# Patient Record
Sex: FEMALE | Race: WHITE | NOT HISPANIC OR LATINO | Employment: FULL TIME | URBAN - METROPOLITAN AREA
[De-identification: names, ages, dates, MRNs, and addresses within clinical notes are randomized per-mention and may not be internally consistent; named-entity substitution may affect disease eponyms.]

---

## 2018-12-06 ENCOUNTER — HOSPITAL ENCOUNTER (INPATIENT)
Facility: HOSPITAL | Age: 48
LOS: 2 days | Discharge: HOME | DRG: 155 | End: 2018-12-09
Attending: EMERGENCY MEDICINE | Admitting: INTERNAL MEDICINE
Payer: COMMERCIAL

## 2018-12-06 DIAGNOSIS — K11.20 SIALADENITIS: Primary | ICD-10-CM

## 2018-12-06 PROCEDURE — 99285 EMERGENCY DEPT VISIT HI MDM: CPT

## 2018-12-06 PROCEDURE — 81025 URINE PREGNANCY TEST: CPT | Performed by: EMERGENCY MEDICINE

## 2018-12-06 RX ORDER — KETOROLAC TROMETHAMINE 30 MG/ML
15 INJECTION, SOLUTION INTRAMUSCULAR; INTRAVENOUS ONCE
Status: COMPLETED | OUTPATIENT
Start: 2018-12-06 | End: 2018-12-07

## 2018-12-06 RX ORDER — DEXAMETHASONE SODIUM PHOSPHATE 4 MG/ML
10 INJECTION, SOLUTION INTRA-ARTICULAR; INTRALESIONAL; INTRAMUSCULAR; INTRAVENOUS; SOFT TISSUE ONCE
Status: COMPLETED | OUTPATIENT
Start: 2018-12-06 | End: 2018-12-07

## 2018-12-07 ENCOUNTER — APPOINTMENT (EMERGENCY)
Dept: CT IMAGING | Facility: HOSPITAL | Age: 48
DRG: 155 | End: 2018-12-07
Payer: COMMERCIAL

## 2018-12-07 PROBLEM — K11.20 SIALADENITIS: Status: ACTIVE | Noted: 2018-12-07

## 2018-12-07 PROBLEM — K21.9 GERD (GASTROESOPHAGEAL REFLUX DISEASE): Status: ACTIVE | Noted: 2018-12-07

## 2018-12-07 PROBLEM — J30.2 SEASONAL ALLERGIES: Status: ACTIVE | Noted: 2018-12-07

## 2018-12-07 LAB
ANION GAP SERPL CALCULATED.3IONS-SCNC: 8 MMOL/L (ref 4–13)
ANION GAP SERPL CALCULATED.3IONS-SCNC: 9 MMOL/L (ref 4–13)
BASOPHILS # BLD AUTO: 0.03 THOUSANDS/ΜL (ref 0–0.1)
BASOPHILS NFR BLD AUTO: 0 % (ref 0–1)
BUN SERPL-MCNC: 10 MG/DL (ref 5–25)
BUN SERPL-MCNC: 13 MG/DL (ref 5–25)
CALCIUM SERPL-MCNC: 10.5 MG/DL (ref 8.3–10.1)
CALCIUM SERPL-MCNC: 11.1 MG/DL (ref 8.3–10.1)
CHLORIDE SERPL-SCNC: 101 MMOL/L (ref 100–108)
CHLORIDE SERPL-SCNC: 105 MMOL/L (ref 100–108)
CO2 SERPL-SCNC: 25 MMOL/L (ref 21–32)
CO2 SERPL-SCNC: 29 MMOL/L (ref 21–32)
CREAT SERPL-MCNC: 0.74 MG/DL (ref 0.6–1.3)
CREAT SERPL-MCNC: 0.81 MG/DL (ref 0.6–1.3)
EOSINOPHIL # BLD AUTO: 0.09 THOUSAND/ΜL (ref 0–0.61)
EOSINOPHIL NFR BLD AUTO: 1 % (ref 0–6)
ERYTHROCYTE [DISTWIDTH] IN BLOOD BY AUTOMATED COUNT: 11.5 % (ref 11.6–15.1)
ERYTHROCYTE [DISTWIDTH] IN BLOOD BY AUTOMATED COUNT: 11.6 % (ref 11.6–15.1)
EXT PREG TEST URINE: NEGATIVE
GFR SERPL CREATININE-BSD FRML MDRD: 87 ML/MIN/1.73SQ M
GFR SERPL CREATININE-BSD FRML MDRD: 97 ML/MIN/1.73SQ M
GLUCOSE SERPL-MCNC: 128 MG/DL (ref 65–140)
GLUCOSE SERPL-MCNC: 165 MG/DL (ref 65–140)
HCT VFR BLD AUTO: 42.8 % (ref 34.8–46.1)
HCT VFR BLD AUTO: 48.1 % (ref 34.8–46.1)
HGB BLD-MCNC: 14.1 G/DL (ref 11.5–15.4)
HGB BLD-MCNC: 15.8 G/DL (ref 11.5–15.4)
IMM GRANULOCYTES # BLD AUTO: 0.03 THOUSAND/UL (ref 0–0.2)
IMM GRANULOCYTES NFR BLD AUTO: 0 % (ref 0–2)
LYMPHOCYTES # BLD AUTO: 0.75 THOUSANDS/ΜL (ref 0.6–4.47)
LYMPHOCYTES NFR BLD AUTO: 7 % (ref 14–44)
MCH RBC QN AUTO: 30.9 PG (ref 26.8–34.3)
MCH RBC QN AUTO: 31.1 PG (ref 26.8–34.3)
MCHC RBC AUTO-ENTMCNC: 32.8 G/DL (ref 31.4–37.4)
MCHC RBC AUTO-ENTMCNC: 32.9 G/DL (ref 31.4–37.4)
MCV RBC AUTO: 94 FL (ref 82–98)
MCV RBC AUTO: 94 FL (ref 82–98)
MONOCYTES # BLD AUTO: 0.62 THOUSAND/ΜL (ref 0.17–1.22)
MONOCYTES NFR BLD AUTO: 6 % (ref 4–12)
NEUTROPHILS # BLD AUTO: 9.29 THOUSANDS/ΜL (ref 1.85–7.62)
NEUTS SEG NFR BLD AUTO: 86 % (ref 43–75)
NRBC BLD AUTO-RTO: 0 /100 WBCS
PLATELET # BLD AUTO: 224 THOUSANDS/UL (ref 149–390)
PLATELET # BLD AUTO: 256 THOUSANDS/UL (ref 149–390)
PMV BLD AUTO: 10.1 FL (ref 8.9–12.7)
PMV BLD AUTO: 10.2 FL (ref 8.9–12.7)
POTASSIUM SERPL-SCNC: 4 MMOL/L (ref 3.5–5.3)
POTASSIUM SERPL-SCNC: 4.2 MMOL/L (ref 3.5–5.3)
RBC # BLD AUTO: 4.54 MILLION/UL (ref 3.81–5.12)
RBC # BLD AUTO: 5.11 MILLION/UL (ref 3.81–5.12)
SODIUM SERPL-SCNC: 138 MMOL/L (ref 136–145)
SODIUM SERPL-SCNC: 139 MMOL/L (ref 136–145)
WBC # BLD AUTO: 10.37 THOUSAND/UL (ref 4.31–10.16)
WBC # BLD AUTO: 10.81 THOUSAND/UL (ref 4.31–10.16)

## 2018-12-07 PROCEDURE — 85025 COMPLETE CBC W/AUTO DIFF WBC: CPT | Performed by: EMERGENCY MEDICINE

## 2018-12-07 PROCEDURE — 96361 HYDRATE IV INFUSION ADD-ON: CPT

## 2018-12-07 PROCEDURE — 85027 COMPLETE CBC AUTOMATED: CPT | Performed by: PHYSICIAN ASSISTANT

## 2018-12-07 PROCEDURE — 96375 TX/PRO/DX INJ NEW DRUG ADDON: CPT

## 2018-12-07 PROCEDURE — 96365 THER/PROPH/DIAG IV INF INIT: CPT

## 2018-12-07 PROCEDURE — 87040 BLOOD CULTURE FOR BACTERIA: CPT | Performed by: EMERGENCY MEDICINE

## 2018-12-07 PROCEDURE — 99220 PR INITIAL OBSERVATION CARE/DAY 70 MINUTES: CPT | Performed by: INTERNAL MEDICINE

## 2018-12-07 PROCEDURE — 80048 BASIC METABOLIC PNL TOTAL CA: CPT | Performed by: PHYSICIAN ASSISTANT

## 2018-12-07 PROCEDURE — 80048 BASIC METABOLIC PNL TOTAL CA: CPT | Performed by: EMERGENCY MEDICINE

## 2018-12-07 PROCEDURE — 36415 COLL VENOUS BLD VENIPUNCTURE: CPT | Performed by: EMERGENCY MEDICINE

## 2018-12-07 PROCEDURE — 70491 CT SOFT TISSUE NECK W/DYE: CPT

## 2018-12-07 RX ORDER — FENTANYL CITRATE 50 UG/ML
50 INJECTION, SOLUTION INTRAMUSCULAR; INTRAVENOUS ONCE
Status: COMPLETED | OUTPATIENT
Start: 2018-12-07 | End: 2018-12-07

## 2018-12-07 RX ORDER — KETOROLAC TROMETHAMINE 30 MG/ML
30 INJECTION, SOLUTION INTRAMUSCULAR; INTRAVENOUS EVERY 6 HOURS PRN
Status: DISPENSED | OUTPATIENT
Start: 2018-12-07 | End: 2018-12-08

## 2018-12-07 RX ORDER — CEPHALEXIN 500 MG/1
500 CAPSULE ORAL EVERY 8 HOURS SCHEDULED
COMMUNITY
End: 2018-12-09 | Stop reason: HOSPADM

## 2018-12-07 RX ORDER — FLUTICASONE PROPIONATE 50 MCG
1 SPRAY, SUSPENSION (ML) NASAL DAILY
Status: DISCONTINUED | OUTPATIENT
Start: 2018-12-07 | End: 2018-12-09 | Stop reason: HOSPADM

## 2018-12-07 RX ORDER — FLUTICASONE PROPIONATE 50 MCG
1 SPRAY, SUSPENSION (ML) NASAL DAILY
COMMUNITY

## 2018-12-07 RX ORDER — ACETAMINOPHEN 325 MG/1
650 TABLET ORAL EVERY 6 HOURS PRN
Status: DISCONTINUED | OUTPATIENT
Start: 2018-12-07 | End: 2018-12-09 | Stop reason: HOSPADM

## 2018-12-07 RX ORDER — MELATONIN
1000 DAILY
COMMUNITY

## 2018-12-07 RX ORDER — PANTOPRAZOLE SODIUM 20 MG/1
20 TABLET, DELAYED RELEASE ORAL
Status: DISCONTINUED | OUTPATIENT
Start: 2018-12-07 | End: 2018-12-09 | Stop reason: HOSPADM

## 2018-12-07 RX ORDER — PANTOPRAZOLE SODIUM 20 MG/1
20 TABLET, DELAYED RELEASE ORAL DAILY
COMMUNITY

## 2018-12-07 RX ORDER — LORATADINE 10 MG/1
10 TABLET ORAL DAILY
Status: DISCONTINUED | OUTPATIENT
Start: 2018-12-07 | End: 2018-12-09 | Stop reason: HOSPADM

## 2018-12-07 RX ORDER — SODIUM CHLORIDE 9 MG/ML
125 INJECTION, SOLUTION INTRAVENOUS CONTINUOUS
Status: DISCONTINUED | OUTPATIENT
Start: 2018-12-07 | End: 2018-12-09 | Stop reason: HOSPADM

## 2018-12-07 RX ORDER — CETIRIZINE HYDROCHLORIDE 5 MG/1
5 TABLET ORAL DAILY
COMMUNITY

## 2018-12-07 RX ORDER — ONDANSETRON 2 MG/ML
4 INJECTION INTRAMUSCULAR; INTRAVENOUS EVERY 6 HOURS PRN
Status: DISCONTINUED | OUTPATIENT
Start: 2018-12-07 | End: 2018-12-09 | Stop reason: HOSPADM

## 2018-12-07 RX ORDER — MELATONIN
1000 DAILY
Status: DISCONTINUED | OUTPATIENT
Start: 2018-12-07 | End: 2018-12-09 | Stop reason: HOSPADM

## 2018-12-07 RX ORDER — HYDROMORPHONE HCL/PF 1 MG/ML
1 SYRINGE (ML) INJECTION
Status: DISCONTINUED | OUTPATIENT
Start: 2018-12-07 | End: 2018-12-09 | Stop reason: HOSPADM

## 2018-12-07 RX ADMIN — SODIUM CHLORIDE 3 G: 9 INJECTION, SOLUTION INTRAVENOUS at 05:51

## 2018-12-07 RX ADMIN — FENTANYL CITRATE 50 MCG: 50 INJECTION, SOLUTION INTRAMUSCULAR; INTRAVENOUS at 02:19

## 2018-12-07 RX ADMIN — SODIUM CHLORIDE 125 ML/HR: 0.9 INJECTION, SOLUTION INTRAVENOUS at 04:19

## 2018-12-07 RX ADMIN — DEXAMETHASONE SODIUM PHOSPHATE 10 MG: 4 INJECTION, SOLUTION INTRAMUSCULAR; INTRAVENOUS at 00:03

## 2018-12-07 RX ADMIN — FLUTICASONE PROPIONATE 1 SPRAY: 50 SPRAY, METERED NASAL at 09:47

## 2018-12-07 RX ADMIN — KETOROLAC TROMETHAMINE 30 MG: 30 INJECTION, SOLUTION INTRAMUSCULAR at 20:23

## 2018-12-07 RX ADMIN — KETOROLAC TROMETHAMINE 30 MG: 30 INJECTION, SOLUTION INTRAMUSCULAR at 09:36

## 2018-12-07 RX ADMIN — SODIUM CHLORIDE 3 G: 9 INJECTION, SOLUTION INTRAVENOUS at 17:50

## 2018-12-07 RX ADMIN — SODIUM CHLORIDE 3 G: 9 INJECTION, SOLUTION INTRAVENOUS at 23:57

## 2018-12-07 RX ADMIN — SODIUM CHLORIDE 3 G: 9 INJECTION, SOLUTION INTRAVENOUS at 12:54

## 2018-12-07 RX ADMIN — SODIUM CHLORIDE 1000 ML: 0.9 INJECTION, SOLUTION INTRAVENOUS at 00:02

## 2018-12-07 RX ADMIN — SODIUM CHLORIDE 125 ML/HR: 0.9 INJECTION, SOLUTION INTRAVENOUS at 20:10

## 2018-12-07 RX ADMIN — LORATADINE 10 MG: 10 TABLET ORAL at 09:46

## 2018-12-07 RX ADMIN — HYDROMORPHONE HYDROCHLORIDE 1 MG: 1 INJECTION, SOLUTION INTRAMUSCULAR; INTRAVENOUS; SUBCUTANEOUS at 23:57

## 2018-12-07 RX ADMIN — KETOROLAC TROMETHAMINE 15 MG: 30 INJECTION, SOLUTION INTRAMUSCULAR at 00:02

## 2018-12-07 RX ADMIN — PANTOPRAZOLE SODIUM 20 MG: 20 TABLET, DELAYED RELEASE ORAL at 05:51

## 2018-12-07 RX ADMIN — HYDROMORPHONE HYDROCHLORIDE 1 MG: 1 INJECTION, SOLUTION INTRAMUSCULAR; INTRAVENOUS; SUBCUTANEOUS at 04:18

## 2018-12-07 RX ADMIN — IOHEXOL 85 ML: 350 INJECTION, SOLUTION INTRAVENOUS at 00:49

## 2018-12-07 RX ADMIN — SODIUM CHLORIDE 125 ML/HR: 0.9 INJECTION, SOLUTION INTRAVENOUS at 12:54

## 2018-12-07 RX ADMIN — SODIUM CHLORIDE 3 G: 9 INJECTION, SOLUTION INTRAVENOUS at 00:59

## 2018-12-07 NOTE — NURSING NOTE
Patient called via call bell to inform, "I think I just coughed up the stone " Dr Adams paged and informed  No change in patients plan, no new orders given  Stone saved in denture cup and given to patient to take to her follow up with ENT

## 2018-12-07 NOTE — PLAN OF CARE
Problem: PAIN - ADULT  Goal: Verbalizes/displays adequate comfort level or baseline comfort level  Interventions:  - Encourage patient to monitor pain and request assistance  - Assess pain using appropriate pain scale  - Administer analgesics based on type and severity of pain and evaluate response  - Implement non-pharmacological measures as appropriate and evaluate response  - Consider cultural and social influences on pain and pain management  - Notify physician/advanced practitioner if interventions unsuccessful or patient reports new pain   Outcome: Progressing      Problem: INFECTION - ADULT  Goal: Absence or prevention of progression during hospitalization  INTERVENTIONS:  - Assess and monitor for signs and symptoms of infection  - Monitor lab/diagnostic results  - Monitor all insertion sites, i e  indwelling lines, tubes, and drains  - Monitor endotracheal (as able) and nasal secretions for changes in amount and color  - [place] appropriate cooling/warming therapies per order  - Administer medications as ordered  - Instruct and encourage patient and family to use good hand hygiene technique  - Identify and instruct in appropriate isolation precautions for identified infection/condition   Outcome: Progressing    Goal: Absence of fever/infection during neutropenic period  INTERVENTIONS:  - Monitor WBC  - Implement neutropenic guidelines   Outcome: Progressing      Problem: SAFETY ADULT  Goal: Patient will remain free of falls  INTERVENTIONS:  - Assess patient frequently for physical needs  -  Identify cognitive and physical deficits and behaviors that affect risk of falls    -  [place] fall precautions as indicated by assessment   - Educate patient/family on patient safety including physical limitations  - Instruct patient to call for assistance with activity based on assessment  - Modify environment to reduce risk of injury  - Consider OT/PT consult to assist with strengthening/mobility   Outcome: Progressing    Goal: Maintain or return to baseline ADL function  INTERVENTIONS:  -  Assess patient's ability to carry out ADLs; assess patient's baseline for ADL function and identify physical deficits which impact ability to perform ADLs (bathing, care of mouth/teeth, toileting, grooming, dressing, etc )  - Assess/evaluate cause of self-care deficits   - Assess range of motion  - Assess patient's mobility; develop plan if impaired  - Assess patient's need for assistive devices and provide as appropriate  - Encourage maximum independence but intervene and supervise when necessary  ¯ Involve family in performance of ADLs  ¯ Assess for home care needs following discharge   ¯ Request OT consult to assist with ADL evaluation and planning for discharge  ¯ Provide patient education as appropriate   Outcome: Progressing    Goal: Maintain or return mobility status to optimal level  INTERVENTIONS:  - Assess patient's baseline mobility status (ambulation, transfers, stairs, etc )    - Identify cognitive and physical deficits and behaviors that affect mobility  - Identify mobility aids required to assist with transfers and/or ambulation (gait belt, sit-to-stand, lift, walker, cane, etc )  - [place] fall precautions as indicated by assessment  - Record patient progress and toleration of activity level on Mobility SBAR; progress patient to next Phase/Stage  - Instruct patient to call for assistance with activity based on assessment  - Request Rehabilitation consult to assist with strengthening/weightbearing, etc    Outcome: Progressing      Problem: DISCHARGE PLANNING  Goal: Discharge to home or other facility with appropriate resources  INTERVENTIONS:  - Identify barriers to discharge w/patient and caregiver  - Arrange for needed discharge resources and transportation as appropriate  - Identify discharge learning needs (meds, wound care, etc )  - Arrange for interpretive services to assist at discharge as needed  - Refer to Case Management Department for coordinating discharge planning if the patient needs post-hospital services based on physician/advanced practitioner order or complex needs related to functional status, cognitive ability, or social support system  Outcome: Progressing      Problem: Knowledge Deficit  Goal: Patient/family/caregiver demonstrates understanding of disease process, treatment plan, medications, and discharge instructions  Complete learning assessment and assess knowledge base    Interventions:  - Provide teaching at level of understanding  - Provide teaching via preferred learning methods  Outcome: Progressing

## 2018-12-07 NOTE — H&P
History and Physical - [facility] Internal Medicine    Patient Information: Nadine Romeo 57 y o  female MRN: [id number]  Unit/Bed#: E5 -01 Encounter: [id number]  Admitting Physician: Kadie Louie PA-C  PCP: No primary care provider on file  Date of Admission:  12/07/18    Assessment/Plan:    Hospital Problem List:     Principal Problem:    Sialadenitis  Active Problems:    GERD (gastroesophageal reflux disease)    Seasonal allergies      Plan for the Primary Problem(s):  · sialadenitis  · Admit to med/surg  Continue unasyn  Consult ENT    Plan for Additional Problems:   · GERD- continue protonix  · Seasonal allergies- continue claritin    VTE Prophylaxis: Pharmacologic VTE Prophylaxis contraindicated due to low risk  / sequential compression device   Code Status: full code  POLST: There is no POLST form on file for this patient (pre-hospital)    Anticipated Length of Stay:  Patient will be admitted on an Observation basis with an anticipated length of stay of  Less than 2 midnights  Justification for Hospital Stay: patient requires IV antibiotics and ENT consult    Total Time for Visit, including Counseling / Coordination of Care: 45 minutes  Greater than 50% of this total time spent on direct patient counseling and coordination of care  Chief Complaint:   Sore throat    History of Present Illness:    Nadine Romeo is a 57 y o  female who presents with sore throat  Patient developed swelling of a lymph node on the right side of her neck several weeks ago but it was not painful  She states 2 days ago she noticed it was larger in size, then it became very painful  Pain extends to jaw and ear  She denies fever  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  Review of Systems:    Review of Systems   Constitutional: Negative  HENT: Positive for sore throat  Eyes: Negative  Respiratory: Negative  Cardiovascular: Negative  Gastrointestinal: Negative  Endocrine: Negative  Genitourinary: Negative  Musculoskeletal: Negative  Skin: Negative  Allergic/Immunologic: Negative  Neurological: Negative  Hematological: Negative  Psychiatric/Behavioral: Negative  Past Medical and Surgical History:     History reviewed  No pertinent past medical history  Past Surgical History:   Procedure Laterality Date    CHOLECYSTECTOMY      HYSTERECTOMY      KNEE SURGERY         Meds/Allergies:    Prior to Admission medications    Medication Sig Start Date End Date Taking? Authorizing Provider   cephalexin (KEFLEX) 500 mg capsule Take 500 mg by mouth every 8 (eight) hours   Yes Historical Provider, MD   cetirizine (ZyrTEC) 5 MG tablet Take 5 mg by mouth daily   Yes Historical Provider, MD   cholecalciferol (VITAMIN D3) 1,000 units tablet Take 1,000 Units by mouth daily   Yes Historical Provider, MD   fluticasone (FLONASE) 50 mcg/act nasal spray 1 spray into each nostril daily   Yes Historical Provider, MD   pantoprazole (PROTONIX) 20 mg tablet Take 20 mg by mouth daily   Yes Historical Provider, MD     I have reviewed home medications with patient personally  Allergies:    Allergies   Allergen Reactions    Morphine        Social History:     Marital Status: /Civil Union   Occupation: /nursing assistant  Patient Pre-hospital Living Situation: lives with family  Patient Pre-hospital Level of Mobility: ambulatory  Patient Pre-hospital Diet Restriction none  Substance Use History:   History   Alcohol Use    Yes     Comment: socailly     History   Smoking Status    Never Smoker   Smokeless Tobacco    Not on file     History   Drug Use No       Family History:    non-contributory    Physical Exam:     Vitals:   Blood Pressure: 126/69 (12/07/18 0221)  Pulse: 94 (12/07/18 0221)  Temperature: 98 8 °F (37 1 °C) (12/06/18 2322)  Temp Source: Oral (12/06/18 2322)  Respirations: 20 (12/07/18 0221)  Weight - Scale: 83 9 kg (185 lb) (12/06/18 6572)  SpO2: 98 % (12/07/18 0221)    Physical Exam   Constitutional: She is oriented to person, place, and time  She appears well-developed and well-nourished  No distress  HENT:   Head: Normocephalic and atraumatic  Eyes: Pupils are equal, round, and reactive to light  Conjunctivae are normal    Neck:   Right neck swelling  Able to speak in complete sentences and tolerating secretions  Cardiovascular: Normal rate and regular rhythm  Pulmonary/Chest: Effort normal and breath sounds normal  No respiratory distress  Abdominal: Soft  Bowel sounds are normal  She exhibits no distension  There is no tenderness  Musculoskeletal: Normal range of motion  She exhibits no edema, tenderness or deformity  Neurological: She is alert and oriented to person, place, and time  Skin: Skin is warm and dry  She is not diaphoretic  No erythema  Psychiatric: She has a normal mood and affect  Her behavior is normal    Vitals reviewed  Additional Data:     Lab Results: I have personally reviewed pertinent reports  Results from last 7 days  Lab Units 12/07/18  0005   WBC Thousand/uL 10 81*   HEMOGLOBIN g/dL 15 8*   HEMATOCRIT % 48 1*   PLATELETS Thousands/uL 256   NEUTROS PCT % 86*   LYMPHS PCT % 7*   MONOS PCT % 6   EOS PCT % 1       Results from last 7 days  Lab Units 12/07/18  0005   POTASSIUM mmol/L 4 0   CHLORIDE mmol/L 101   CO2 mmol/L 29   BUN mg/dL 13   CREATININE mg/dL 0 81   CALCIUM mg/dL 11 1*           Imaging: I have personally reviewed pertinent reports  Ct Soft Tissue Neck With Contrast    Result Date: 12/7/2018  Narrative: CT NECK WITH CONTRAST INDICATION:   Right submandibular swelling  COMPARISON:  None  TECHNIQUE:  Axial, sagittal, and coronal 2D reformatted images were created from the axial source data and submitted for interpretation  Radiation dose length product (DLP) for this visit:  750 mGy-cm     This examination, like all CT scans performed in the [facility], was performed utilizing techniques to minimize radiation dose exposure, including the use of iterative reconstruction and automated exposure control  IV Contrast:  85 mL of iohexol (OMNIPAQUE) IMAGE QUALITY:  Diagnostic  FINDINGS: VISUALIZED BRAIN PARENCHYMA:  No acute intracranial pathology of the visualized brain parenchyma  VISUALIZED ORBITS AND PARANASAL SINUSES:  Normal  NASAL CAVITY AND NASOPHARYNX:  Normal  SUPRAHYOID NECK:  Normal oral cavity, tongue base, tonsillar fossa and epiglottis  INFRAHYOID NECK:  Aryepiglottic folds and piriform sinuses are normal   Normal glottis and subglottic airway  THYROID GLAND: Hypoplastic left thyroid gland  Incidental discovery of one or more thyroid nodule(s) measuring less than 1 5 cm and without suspicious features is noted in this patient who is above 28years old; according to guidelines published in the February 2015 white paper on incidental thyroid nodules in the Journal of the [employer] of Radiology [facility]), no further evaluation is recommended  PAROTID AND SUBMANDIBULAR GLANDS:  There is a 5 mm obstructing stone in the proximal duct for the right submandibular gland which is enlarged and hyperemic  Some associated surrounding fat stranding/inflammation in the right submandibular region as well  highly suspicious for obstructive sialadenitis  LYMPH NODES:  No pathologic or enlarged adenopathy  VASCULAR STRUCTURES:  Mild atherosclerosis of the proximal right internal carotid artery  No hemodynamically significant stenosis is seen  Otherwise normal enhancement of the cervical vasculature  THORACIC INLET:  Lung apices and upper mediastinum are unremarkable  BONY STRUCTURES: No acute fracture or destructive osseous lesion  Impression: 5 mm obstructing stone in the proximal duct of the right submandibular gland with associated obstructive sialadenitis and surrounding inflammatory changes as described above  Other findings as above  Workstation performed: [id number]       EKG, Pathology, and Other Studies Reviewed on Admission:   · EKG: NSR    Allscripts / Epic Records Reviewed: Yes     ** Please Note: This note has been constructed using a voice recognition system   **

## 2018-12-07 NOTE — ED ATTENDING ATTESTATION
Raheel Oropeza MD, saw and evaluated the patient  I have discussed the patient with the resident/non-physician practitioner and agree with the resident's/non-physician practitioner's findings, Plan of Care, and MDM as documented in the resident's/non-physician practitioner's note, except where noted  All available labs and Radiology studies were reviewed  At this point I agree with the current assessment done in the Emergency Department  I have conducted an independent evaluation of this patient a history and physical is as follows:  Patient with c/o right submandibular swelling, noticed first about 6 weeks back, initially small in size, started to have pain since yesterday, PCP put her on Keflex, not feeling better, today increased in size, associated with pain and dysphagia; denies fever, dyspnea  On exam, no acute distress, VSS, no drooling, no trismus, airway intact, relatively large right submandibular swelling noted, no submental swelling, no uvular deviation, no tongue/peritonsillar swelling noted  D/D: Rule out deep space infection abscess, infected lymph node, Kurt's angina  We will check labs, get CT soft tissue Neck, give IVF, pain meds, Unasyn  Update:  Labs within normal limits  CT scan shows some adenitis, with obstructing 5 mm stone  We will admit to Medicine for IV antibiotics, ENT evaluation as inpatient  ED Course as of Dec 07 2151   Fri Dec 07, 2018   0206 CT scan results reviewed, 5 mm obstructing stone causing sialadenitis  We will admit patient for IV antibiotics, airway observation, ENT evaluation as inpatient          Critical Care Time  CritCare Time    Procedures

## 2018-12-07 NOTE — ED PROVIDER NOTES
History  Chief Complaint   Patient presents with    Sore Throat - Complicated     pt reports lump to right side of neck, seen by family doc and dx with blocked salivary gland, rx for keflex, pt reports increasing pain, diffiuclty swallowing and talking, lump noted to right side of neck     57year-old healthy woman presents for evaluation of right neck swelling  She reports a swollen lymph node in the area she noticed 6 weeks ago  The area acutely worsened yesterday with progressive pain, swelling, and erythema  She notes associated odynophagia and dysphagia  No fevers, chills, URI symptoms, dental issues, difficulty managing secretions, nausea, or vomiting  She was evaluated by her PCP yesterday and given a prescription for Keflex and symptomatic treatment for a suspected salivary gland stone  On arrival, patient is afebrile, tachycardic to 111, and hypertensive to 176/99 with otherwise normal vital signs  Physical exam shows a tender nodule in the right submandibular space with associated edema and erythema  No submental edema, trismus, or difficulty managing secretions  No acute dental issues and an unremarkable posterior pharynx  Patient does have change in voice  She is protecting her airway  Will perform CT soft-tissue neck with contrast to evaluate for deep-space neck infection  Will check basic labs and urine pregnancy  Will administer IV fluids, Toradol, Decadron, and reassess  Prior to Admission Medications   Prescriptions Last Dose Informant Patient Reported? Taking?    cephalexin (KEFLEX) 500 mg capsule 12/7/2018 at Unknown time  Yes Yes   Sig: Take 500 mg by mouth every 8 (eight) hours   cetirizine (ZyrTEC) 5 MG tablet 12/7/2018 at Unknown time  Yes Yes   Sig: Take 5 mg by mouth daily   cholecalciferol (VITAMIN D3) 1,000 units tablet 12/7/2018 at Unknown time  Yes Yes   Sig: Take 1,000 Units by mouth daily   fluticasone (FLONASE) 50 mcg/act nasal spray 12/7/2018 at Unknown time  Yes Yes Si spray into each nostril daily   pantoprazole (PROTONIX) 20 mg tablet 2018 at Unknown time  Yes Yes   Sig: Take 20 mg by mouth daily      Facility-Administered Medications: None       History reviewed  No pertinent past medical history  Past Surgical History:   Procedure Laterality Date    CHOLECYSTECTOMY      HYSTERECTOMY      KNEE SURGERY         History reviewed  No pertinent family history  I have reviewed and agree with the history as documented  Social History   Substance Use Topics    Smoking status: Never Smoker    Smokeless tobacco: Not on file    Alcohol use Yes      Comment: socailly        Review of Systems   Constitutional: Negative for chills and fever  HENT: Positive for sinus pain, trouble swallowing and voice change  Negative for rhinorrhea and sore throat  Eyes: Negative for photophobia and visual disturbance  Respiratory: Negative for cough and shortness of breath  Cardiovascular: Negative for chest pain and leg swelling  Gastrointestinal: Negative for abdominal pain, diarrhea, nausea and vomiting  Genitourinary: Negative for dysuria, frequency and hematuria  Musculoskeletal: Positive for neck pain  Negative for back pain  Skin: Negative for rash and wound  Neurological: Negative for light-headedness and headaches         Physical Exam  ED Triage Vitals   Temperature Pulse Respirations Blood Pressure SpO2   18 2322 18 2322 18 2322 18 2322 18 232   98 8 °F (37 1 °C) (!) 111 22 (!) 176/99 98 %      Temp Source Heart Rate Source Patient Position - Orthostatic VS BP Location FiO2 (%)   18 2322 18 0059 18 0059 18 0059 --   Oral Monitor Lying Left arm       Pain Score       18 2322       Worst Possible Pain           Orthostatic Vital Signs  Vitals:    18 2357 18 0717 18 1528 18 2234   BP: 106/60 111/71 132/66 148/87   Pulse: 57 73 81 70   Patient Position - Orthostatic VS: Lying Lying Lying Lying       Physical Exam   Constitutional: She is oriented to person, place, and time  She appears well-developed and well-nourished  No distress  HENT:   Head: Normocephalic and atraumatic  Tender nodule in right submandibular space with associated edema and erythema, no submental edema, no trismus, managing secretions, protecting airway, no dental or posterior pharyngeal/tonsillar changes   Eyes: Pupils are equal, round, and reactive to light  Conjunctivae are normal  No scleral icterus  Neck: Neck supple  ROM limited secondary to pain, no signs of meningismus   Cardiovascular: Normal rate, regular rhythm and normal heart sounds  Exam reveals no gallop and no friction rub  No murmur heard  Pulmonary/Chest: Effort normal and breath sounds normal  No respiratory distress  She has no wheezes  She has no rales  Abdominal: Soft  She exhibits no distension  There is no tenderness  There is no rebound and no guarding  Musculoskeletal: She exhibits no edema or tenderness  Neurological: She is alert and oriented to person, place, and time  Skin: Skin is warm and dry  She is not diaphoretic  Psychiatric: She has a normal mood and affect  Her behavior is normal  Thought content normal    Vitals reviewed        ED Medications  Medications   ketorolac (TORADOL) injection 30 mg (30 mg Intravenous Given 12/7/18 2023)   sodium chloride 0 9 % infusion (125 mL/hr Intravenous New Bag 12/8/18 1449)   ondansetron (ZOFRAN) injection 4 mg (4 mg Intravenous Given 12/8/18 2024)   acetaminophen (TYLENOL) tablet 650 mg (650 mg Oral Given 12/8/18 0857)   loratadine (CLARITIN) tablet 10 mg (10 mg Oral Given 12/8/18 0854)   cholecalciferol (VITAMIN D3) tablet 1,000 Units (1,000 Units Oral Given 12/8/18 0854)   fluticasone (FLONASE) 50 mcg/act nasal spray 1 spray (1 spray Nasal Given 12/8/18 0854)   pantoprazole (PROTONIX) EC tablet 20 mg (20 mg Oral Given 12/9/18 0545)   ampicillin-sulbactam (UNASYN) 3 g in sodium chloride 0 9 % 100 mL IVPB (3 g Intravenous New Bag 12/9/18 0545)   HYDROmorphone (DILAUDID) injection 1 mg (1 mg Intravenous Given 12/9/18 0545)   dexamethasone (DECADRON) injection 10 mg (10 mg Intravenous Given 12/9/18 0014)   sodium chloride 0 9 % bolus 1,000 mL (0 mL Intravenous Stopped 12/7/18 0114)   ketorolac (TORADOL) injection 15 mg (15 mg Intravenous Given 12/7/18 0002)   dexamethasone (DECADRON) injection 10 mg (10 mg Intravenous Given 12/7/18 0003)   ampicillin-sulbactam (UNASYN) 3 g in sodium chloride 0 9 % 100 mL IVPB (0 g Intravenous Stopped 12/7/18 0145)   iohexol (OMNIPAQUE) 350 MG/ML injection (SINGLE-DOSE) 85 mL (85 mL Intravenous Given 12/7/18 0049)   fentanyl citrate (PF) 100 MCG/2ML 50 mcg (50 mcg Intravenous Given 12/7/18 0219)   dexamethasone (DECADRON) injection 10 mg (10 mg Intravenous Given 12/8/18 1312)       Diagnostic Studies  Results Reviewed     Procedure Component Value Units Date/Time    Blood culture #1 [[id number]] Collected:  12/07/18 0005    Lab Status:  Preliminary result Specimen:  Blood from Line, Venous Updated:  12/08/18 0901     Blood Culture No Growth at 24 hrs  Blood culture #2 [[id number]] Collected:  12/07/18 0014    Lab Status:  Preliminary result Specimen:  Blood from Hand, Left Updated:  12/08/18 0901     Blood Culture No Growth at 24 hrs  Basic metabolic panel [[id number]]  (Abnormal) Collected:  12/07/18 0005    Lab Status:  Final result Specimen:  Blood from Arm, Right Updated:  12/07/18 0031     Sodium 138 mmol/L      Potassium 4 0 mmol/L      Chloride 101 mmol/L      CO2 29 mmol/L      ANION GAP 8 mmol/L      BUN 13 mg/dL      Creatinine 0 81 mg/dL      Glucose 128 mg/dL      Calcium 11 1 (H) mg/dL      eGFR 87 ml/min/1 73sq m     Narrative:         National Kidney Disease Education Program recommendations are as follows:  GFR calculation is accurate only with a steady state creatinine  Chronic Kidney disease less than 60 ml/min/1 73 sq  meters  Kidney failure less than 15 ml/min/1 73 sq  meters  CBC and differential [[id number]]  (Abnormal) Collected:  12/07/18 0005    Lab Status:  Final result Specimen:  Blood from Arm, Right Updated:  12/07/18 0019     WBC 10 81 (H) Thousand/uL      RBC 5 11 Million/uL      Hemoglobin 15 8 (H) g/dL      Hematocrit 48 1 (H) %      MCV 94 fL      MCH 30 9 pg      MCHC 32 8 g/dL      RDW 11 6 %      MPV 10 1 fL      Platelets 812 Thousands/uL      nRBC 0 /100 WBCs      Neutrophils Relative 86 (H) %      Immat GRANS % 0 %      Lymphocytes Relative 7 (L) %      Monocytes Relative 6 %      Eosinophils Relative 1 %      Basophils Relative 0 %      Neutrophils Absolute 9 29 (H) Thousands/µL      Immature Grans Absolute 0 03 Thousand/uL      Lymphocytes Absolute 0 75 Thousands/µL      Monocytes Absolute 0 62 Thousand/µL      Eosinophils Absolute 0 09 Thousand/µL      Basophils Absolute 0 03 Thousands/µL     POCT pregnancy, urine [[id number]]  (Normal) Resulted:  12/07/18 0000    Lab Status:  Final result Updated:  12/07/18 0006     EXT PREG TEST UR (Ref: Negative) negative                 CT soft tissue neck with contrast   Final Result by Johana Quarles DO (12/07 0203)      5 mm obstructing stone in the proximal duct of the right submandibular gland with associated obstructive sialadenitis and surrounding inflammatory changes as described above  Other findings as above  Workstation performed: [id number]               Procedures  Procedures      Phone Consults  ED Phone Contact    ED Course                               MDM  Number of Diagnoses or Management Options  Sialadenitis:   Diagnosis management comments: CT soft-tissue neck with contrast showed a 5mm obstructing stone in the right submandibular gland with obstructive sialadenitis with surrounding inflammatory changes  Patient was given Unasyn, Decadron, and IV fluids  Patient protecting her airway   She was admitted to medicine for further management including ENT evaluation  CritCare Time    Disposition  Final diagnoses:   Sialadenitis     Time reflects when diagnosis was documented in both MDM as applicable and the Disposition within this note     Time User Action Codes Description Comment    12/7/2018  2:15 AM Gingervirginia Marisela Add [K11 20] Sialadenitis       ED Disposition     ED Disposition Condition Comment    Admit  Case was discussed with LISETH and the patient's admission status was agreed to be Admission Status: observation status to the service of [facility]  Follow-up Information    None         Current Discharge Medication List      CONTINUE these medications which have NOT CHANGED    Details   cephalexin (KEFLEX) 500 mg capsule Take 500 mg by mouth every 8 (eight) hours      cetirizine (ZyrTEC) 5 MG tablet Take 5 mg by mouth daily      cholecalciferol (VITAMIN D3) 1,000 units tablet Take 1,000 Units by mouth daily      fluticasone (FLONASE) 50 mcg/act nasal spray 1 spray into each nostril daily      pantoprazole (PROTONIX) 20 mg tablet Take 20 mg by mouth daily           No discharge procedures on file  ED Provider  Attending physically available and evaluated Magali Jenny  I managed the patient along with the ED Attending      Electronically Signed by         Viktoria Moon MD  12/09/18 8191

## 2018-12-07 NOTE — UTILIZATION REVIEW
Initial Clinical Review    Admission: Date/Time/Statement:   OBS  ORDER  12/7  @  0222     12/07/18 1236  Inpatient Admission Once     Transfer Service: General Medicine       Question Answer Comment   Admitting Physician Antoinette Pathak    Level of Care Med Surg    Estimated length of stay More than 2 Midnights    Certification I certify that inpatient services are medically necessary for this patient for a duration of greater than two midnights  See H&P and MD Progress Notes for additional information about the patient's course of treatment  12/07/18 1235           ED: Date/Time/Mode of Arrival:   ED Arrival Information     Expected Arrival Acuity Means of Arrival Escorted By Service Admission Type    - 12/6/2018 23:15 Urgent Walk-In Self Hospitalist Urgent    Arrival Complaint    sore throat          Chief Complaint:   Chief Complaint   Patient presents with    Sore Throat - Complicated     pt reports lump to right side of neck, seen by family doc and dx with blocked salivary gland, rx for keflex, pt reports increasing pain, diffiuclty swallowing and talking, lump noted to right side of neck       History of Illness: Katelyn Godoy is a 57 y o  female who presents with sore throat  Patient developed swelling of a lymph node on the right side of her neck several weeks ago but it was not painful  She states 2 days ago she noticed it was larger in size, then it became very painful  Pain extends to jaw and ear  She denies fever   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    ED Vital Signs:   ED Triage Vitals   Temperature Pulse Respirations Blood Pressure SpO2   12/06/18 2322 12/06/18 2322 12/06/18 2322 12/06/18 2322 12/06/18 2322   98 8 °F (37 1 °C) (!) 111 22 (!) 176/99 98 %      Temp Source Heart Rate Source Patient Position - Orthostatic VS BP Location FiO2 (%)   12/06/18 2322 12/07/18 0059 12/07/18 0059 12/07/18 0059 --   Oral Monitor Lying Left arm       Pain Score 12/06/18 2322       Worst Possible Pain        Wt Readings from Last 1 Encounters:   12/06/18 83 9 kg (185 lb)       Vital Signs (abnormal):    above    Abnormal Labs/Diagnostic Test Results:   WBC   10 81  H/H   15 8/48  1  Abs neutro    9 29  Ct  Soft  Tissue  Of  Neck:     5 mm obstructing stone in the proximal duct of the right submandibular gland with associated obstructive sialadenitis and surrounding inflammatory changes as described above  ED Treatment:   Medication Administration from 12/06/2018 2315 to 12/07/2018 0243       Date/Time Order Dose Route Action Action by Comments     12/07/2018 0114 sodium chloride 0 9 % bolus 1,000 mL 0 mL Intravenous Stopped Negro Orellana RN      12/07/2018 0002 sodium chloride 0 9 % bolus 1,000 mL 1,000 mL Intravenous [address] Negro Orellana RN      12/07/2018 0002 ketorolac (TORADOL) injection 15 mg 15 mg Intravenous Given Negro Orellana RN      12/07/2018 0003 dexamethasone (DECADRON) injection 10 mg 10 mg Intravenous Given Negro Orellana RN      12/07/2018 0145 ampicillin-sulbactam (UNASYN) 3 g in sodium chloride 0 9 % 100 mL IVPB 0 g Intravenous Stopped Negro Orellana RN      12/07/2018 0059 ampicillin-sulbactam (UNASYN) 3 g in sodium chloride 0 9 % 100 mL IVPB 3 g Intravenous [address] Negro Orellana RN      12/07/2018 0049 iohexol (OMNIPAQUE) 350 MG/ML injection (SINGLE-DOSE) 85 mL 85 mL Intravenous Given Alethea Crawford      12/07/2018 0219 fentanyl citrate (PF) 100 MCG/2ML 50 mcg 50 mcg Intravenous Given Negro Orellana RN           Past Medical/Surgical History: Active Ambulatory Problems     Diagnosis Date Noted    No Active Ambulatory Problems     Resolved Ambulatory Problems     Diagnosis Date Noted    No Resolved Ambulatory Problems     No Additional Past Medical History       Admitting Diagnosis: Sialadenitis [K11 20]  Sore throat [J02 9]    Age/Sex: 57 y o  female    · Assessment/Plan: sialadenitis  ? Admit to med/surg  Continue unasyn  Consult ENT     Plan for Additional Problems:   · GERD- continue protonix  · Seasonal allergies- continue claritin     VTE Prophylaxis: Pharmacologic VTE Prophylaxis contraindicated due to low risk  / sequential compression device   Code Status: full code  POLST: There is no POLST form on file for this patient (pre-hospital)     Anticipated Length of Stay:  Patient will be admitted on an Observation basis with an anticipated length of stay of  Less than 2 midnights  Justification for Hospital Stay: patient requires IV antibiotics and ENT consult    Admission Orders:     IP    ORDER  12/7  @  1236  Scheduled Meds:   Current Facility-Administered Medications:  acetaminophen 650 mg Oral Q6H PRN Derald Bracket, PA-C    ampicillin-sulbactam 3 g Intravenous Q6H Derald Bracket, PA-C Last Rate: 3 g (12/07/18 0551)   cholecalciferol 1,000 Units Oral Daily Derald Bracket, PA-C    fluticasone 1 spray Nasal Daily Derald Bracket, PA-C    HYDROmorphone 1 mg Intravenous Q3H PRN Derald Bracket, PA-C    ketorolac 30 mg Intravenous Q6H PRN Derald Bracket, PA-C    loratadine 10 mg Oral Daily Derald Bracket, PA-C    ondansetron 4 mg Intravenous Q6H PRN Derald Bracket, PA-C    pantoprazole 20 mg Oral Early Morning Derald Bracket, PA-C    sodium chloride 125 mL/hr Intravenous Continuous Derald Bracket, PA-C Last Rate: 125 mL/hr (12/07/18 0419)     Continuous Infusions:   sodium chloride 125 mL/hr Last Rate: 125 mL/hr (12/07/18 0419)     PRN Meds:   acetaminophen    HYDROmorphone    ketorolac    ondansetron     Cons  ENT  Cl liq  Diet  IV  Dilaudid  PRN   (  x1  12/7  Thus far)  IV  toradol PRN ( x1  12/7  Thus far)    PER  ENT  CONSULT:    Right submandibular sialoadenitis with secondary cellulitis  Plan:  Patient already feels improved since yesterday as she has been on IV steroids and antibiotics  She states there has been significant improvement in the swelling on the right side of her neck      If patient is improved tomorrow she may be discharged home on oral equivalent  She will be given instructions on sialagogues, increased hydration, and massage of the gland in a posterior anterior direction  She may follow-up my office early next week at which point I will re-evaluate the region  If she has less trismus that should be able to open her mouth wide enough to remove the stone in the office setting without difficulty  Neck: Normal range of motion  No JVD present  No tracheal deviation present  No thyromegaly present  She has a significantly enlarged and firm and tender right submandibular gland which is approximately 2 times the size as the left  There is significant tenderness when this is palpated  Pulmonary/Chest: No stridor  [address] [facility] Review Department  Phone: [phone]; Fax [phone]  [email or web address]  ATTENTION: Please call with any questions or concerns to [phone]  and carefully listen to the prompts so that you are directed to the right person  Send all requests for admission clinical reviews, approved or denied determinations and any other requests to fax [phone]   All voicemails are confidential

## 2018-12-07 NOTE — NURSING NOTE
Reassessed patient at   Patient was resting comfortably in bed with her son in the room  On assessment, no changes were noted with the exception of the patient's pain level  She reported that she passed her salivary stone earlier in the day and that the pain and pressure had subsided almost immediately after  Will continue to monitor the patient

## 2018-12-07 NOTE — CONSULTS
Consultation - ENT   Elver Mccloud 57 y o  female MRN: [id number]  Unit/Bed#: E5 -01 Encounter: [id number]      Assessment/Plan     Assessment:  Right submandibular sialoadenitis with secondary cellulitis  Plan:  Patient already feels improved since yesterday as she has been on IV steroids and antibiotics  She states there has been significant improvement in the swelling on the right side of her neck  If patient is improved tomorrow she may be discharged home on oral equivalent  She will be given instructions on sialagogues, increased hydration, and massage of the gland in a posterior anterior direction  She may follow-up my office early next week at which point I will re-evaluate the region  If she has less trismus that should be able to open her mouth wide enough to remove the stone in the office setting without difficulty  History of Present Illness   Physician Requesting Consult: Vijaya Velasco,   Reason for Consult / Principal Problem:  Right submandibular sialoadenitis  HPI: Elver Mccloud is a 57y o  year old female who presents with progressive right-sided neck pain  She developed some swelling in the gland a few weeks ago and earlier this week was seen by her primary doctor  Yesterday she was started on some Keflex and her issues continued  She presented to the ER for further evaluation  CT scan of the neck demonstrated a large stone at the proximal end of the right submandibular duct  Since being in the hospital yesterday she has had some improvement in her symptoms and states there is less swelling and less pain but is still very discomforting  She also has noticed that she has difficulty opening her mouth  Inpatient consult to ENT  Consult performed by: Sherri Sun ordered by: Kayla Brice          Review of Systems   Constitutional: Negative  HENT: Positive for facial swelling (mostly in area of right submandibular region) and trouble swallowing   Ear pain: right sided  Musculoskeletal: Positive for neck pain (with associated trismus)  Historical Information   History reviewed  No pertinent past medical history  Past Surgical History:   Procedure Laterality Date    CHOLECYSTECTOMY      HYSTERECTOMY      KNEE SURGERY       Social History   History   Alcohol Use    Yes     Comment: socailly     History   Drug Use No     History   Smoking Status    Never Smoker   Smokeless Tobacco    Not on file     Family History: History reviewed  No pertinent family history  Meds/Allergies   current meds:   Current Facility-Administered Medications   Medication Dose Route Frequency    acetaminophen (TYLENOL) tablet 650 mg  650 mg Oral Q6H PRN    ampicillin-sulbactam (UNASYN) 3 g in sodium chloride 0 9 % 100 mL IVPB  3 g Intravenous Q6H    cholecalciferol (VITAMIN D3) tablet 1,000 Units  1,000 Units Oral Daily    fluticasone (FLONASE) 50 mcg/act nasal spray 1 spray  1 spray Nasal Daily    HYDROmorphone (DILAUDID) injection 1 mg  1 mg Intravenous Q3H PRN    ketorolac (TORADOL) injection 30 mg  30 mg Intravenous Q6H PRN    loratadine (CLARITIN) tablet 10 mg  10 mg Oral Daily    ondansetron (ZOFRAN) injection 4 mg  4 mg Intravenous Q6H PRN    pantoprazole (PROTONIX) EC tablet 20 mg  20 mg Oral Early Morning    sodium chloride 0 9 % infusion  125 mL/hr Intravenous Continuous    and PTA meds:   Prior to Admission Medications   Prescriptions Last Dose Informant Patient Reported? Taking?    cephalexin (KEFLEX) 500 mg capsule 2018 at Unknown time  Yes Yes   Sig: Take 500 mg by mouth every 8 (eight) hours   cetirizine (ZyrTEC) 5 MG tablet 2018 at Unknown time  Yes Yes   Sig: Take 5 mg by mouth daily   cholecalciferol (VITAMIN D3) 1,000 units tablet 2018 at Unknown time  Yes Yes   Sig: Take 1,000 Units by mouth daily   fluticasone (FLONASE) 50 mcg/act nasal spray 2018 at Unknown time  Yes Yes   Si spray into each nostril daily   pantoprazole (PROTONIX) 20 mg tablet 12/7/2018 at Unknown time  Yes Yes   Sig: Take 20 mg by mouth daily      Facility-Administered Medications: None       Allergies   Allergen Reactions    Morphine        Objective       Intake/Output Summary (Last 24 hours) at 12/07/18 0908  Last data filed at 12/07/18 0145   Gross per 24 hour   Intake             1100 ml   Output                0 ml   Net             1100 ml       Invasive Devices     Peripheral Intravenous Line            Peripheral IV 12/07/18 Right Antecubital less than 1 day                Physical Exam   Constitutional: She appears well-developed and well-nourished  No distress  HENT:   Head: Normocephalic and atraumatic  Right Ear: External ear normal    Left Ear: External ear normal    Oral cavity - excellent dentition  She has evidence of trismus and can only open the mouth approximately 1-2 fingerbreadths  Floor of mouth demonstrates bilateral torus mandibularis  Oral mucosa is moist   Palpation with a finger demonstrates a large stone at the proximal end of the right submandibular duct  Visually it appears as though the stone may be starting to extrude through the duct wall itself  There is still a few layers of mucosal membrane surrounding this and I am unable to remove the secondary to location and trismus  Oropharynx difficult to evaluate but she seems to have a midline uvula and I am able with a light to see the posterior pharyngeal wall but is difficult due to the trismus  Eyes: Pupils are equal, round, and reactive to light  Conjunctivae and EOM are normal    Neck: Normal range of motion  No JVD present  No tracheal deviation present  No thyromegaly present  She has a significantly enlarged and firm and tender right submandibular gland which is approximately 2 times the size as the left  There is significant tenderness when this is palpated  Pulmonary/Chest: No stridor  Lymphadenopathy:     She has no cervical adenopathy     Skin: She is not diaphoretic  Lab Results:   CBC:   Lab Results   Component Value Date    WBC 10 37 (H) 12/07/2018    HGB 14 1 12/07/2018    HCT 42 8 12/07/2018    MCV 94 12/07/2018     12/07/2018    MCH 31 1 12/07/2018    MCHC 32 9 12/07/2018    RDW 11 5 (L) 12/07/2018    MPV 10 2 12/07/2018    NRBC 0 12/07/2018   , Coags: No results found for: PT, PTT, INR  Imaging Studies: I have personally reviewed pertinent films in PACS  EKG, Pathology, and Other Studies: I have personally reviewed pertinent reports      Code Status: Level 1 - Full Code  Advance Directive and Living Will:      Power of :    POLST:      None

## 2018-12-08 PROCEDURE — 99232 SBSQ HOSP IP/OBS MODERATE 35: CPT | Performed by: INTERNAL MEDICINE

## 2018-12-08 RX ORDER — DEXAMETHASONE SODIUM PHOSPHATE 4 MG/ML
10 INJECTION, SOLUTION INTRA-ARTICULAR; INTRALESIONAL; INTRAMUSCULAR; INTRAVENOUS; SOFT TISSUE EVERY 12 HOURS SCHEDULED
Status: DISCONTINUED | OUTPATIENT
Start: 2018-12-09 | End: 2018-12-09 | Stop reason: HOSPADM

## 2018-12-08 RX ORDER — DEXAMETHASONE SODIUM PHOSPHATE 4 MG/ML
10 INJECTION, SOLUTION INTRA-ARTICULAR; INTRALESIONAL; INTRAMUSCULAR; INTRAVENOUS; SOFT TISSUE ONCE
Status: COMPLETED | OUTPATIENT
Start: 2018-12-08 | End: 2018-12-08

## 2018-12-08 RX ADMIN — HYDROMORPHONE HYDROCHLORIDE 1 MG: 1 INJECTION, SOLUTION INTRAMUSCULAR; INTRAVENOUS; SUBCUTANEOUS at 15:23

## 2018-12-08 RX ADMIN — SODIUM CHLORIDE 3 G: 9 INJECTION, SOLUTION INTRAVENOUS at 17:33

## 2018-12-08 RX ADMIN — ONDANSETRON 4 MG: 2 INJECTION INTRAMUSCULAR; INTRAVENOUS at 20:24

## 2018-12-08 RX ADMIN — LORATADINE 10 MG: 10 TABLET ORAL at 08:54

## 2018-12-08 RX ADMIN — SODIUM CHLORIDE 3 G: 9 INJECTION, SOLUTION INTRAVENOUS at 04:51

## 2018-12-08 RX ADMIN — SODIUM CHLORIDE 3 G: 9 INJECTION, SOLUTION INTRAVENOUS at 23:32

## 2018-12-08 RX ADMIN — SODIUM CHLORIDE 125 ML/HR: 0.9 INJECTION, SOLUTION INTRAVENOUS at 14:49

## 2018-12-08 RX ADMIN — ACETAMINOPHEN 650 MG: 325 TABLET ORAL at 08:57

## 2018-12-08 RX ADMIN — VITAMIN D, TAB 1000IU (100/BT) 1000 UNITS: 25 TAB at 08:54

## 2018-12-08 RX ADMIN — DEXAMETHASONE SODIUM PHOSPHATE 10 MG: 4 INJECTION, SOLUTION INTRAMUSCULAR; INTRAVENOUS at 13:12

## 2018-12-08 RX ADMIN — HYDROMORPHONE HYDROCHLORIDE 1 MG: 1 INJECTION, SOLUTION INTRAMUSCULAR; INTRAVENOUS; SUBCUTANEOUS at 23:32

## 2018-12-08 RX ADMIN — HYDROMORPHONE HYDROCHLORIDE 1 MG: 1 INJECTION, SOLUTION INTRAMUSCULAR; INTRAVENOUS; SUBCUTANEOUS at 04:50

## 2018-12-08 RX ADMIN — HYDROMORPHONE HYDROCHLORIDE 1 MG: 1 INJECTION, SOLUTION INTRAMUSCULAR; INTRAVENOUS; SUBCUTANEOUS at 20:15

## 2018-12-08 RX ADMIN — PANTOPRAZOLE SODIUM 20 MG: 20 TABLET, DELAYED RELEASE ORAL at 05:33

## 2018-12-08 RX ADMIN — FLUTICASONE PROPIONATE 1 SPRAY: 50 SPRAY, METERED NASAL at 08:54

## 2018-12-08 RX ADMIN — ONDANSETRON 4 MG: 2 INJECTION INTRAMUSCULAR; INTRAVENOUS at 04:56

## 2018-12-08 RX ADMIN — SODIUM CHLORIDE 3 G: 9 INJECTION, SOLUTION INTRAVENOUS at 13:08

## 2018-12-08 NOTE — PROGRESS NOTES
[facility] Progress Note  Patient: Katelyn Godoy 57 y o  female   MRN: [id number]  PCP: No primary care provider on file  Unit/Bed#: E5 -01 Encounter: [id number]  Date Of Visit: 12/08/18      Assessment/plan  1  Right submandibular sialoadenitis  with secondary cellulitis- appreciate ent recommendations  Pt passed stone last night but still has decreased rom and feels the area is worsening again  Will continue IV steroids  Continue IV antibiotics  Will increase the frequency of steroids and hopefully pt will improve tomorrow that can change to po and follow up with ent outpt  There is a possibility that she has another stone  Could repeat ct however would not  as procedure can not be preformed unless she has increased rom  2  gerd- continue ppi    Subjective:  Pt seen and examined  Pt passed stone last evening and felt much better however this am she feels the pressure is building back up  She does have a little more range of motion but still is very sore  She is worried there may be another stone forming  She would like her diet to be increased  No other problems noted  No f/c no cp no sob no n/v/d no abd pain    Objective:     Vitals: Blood pressure 111/71, pulse 73, temperature (!) 96 6 °F (35 9 °C), temperature source Temporal, resp  rate 18, weight 83 9 kg (185 lb), SpO2 98 %  ,There is no height or weight on file to calculate BMI      Lab, Imaging and other studies:    Results from last 7 days  Lab Units 12/07/18  0517   WBC Thousand/uL 10 37*   HEMOGLOBIN g/dL 14 1   HEMATOCRIT % 42 8   PLATELETS Thousands/uL 224       Results from last 7 days  Lab Units 12/07/18  0517   POTASSIUM mmol/L 4 2   CHLORIDE mmol/L 105   CO2 mmol/L 25   BUN mg/dL 10   CREATININE mg/dL 0 74   CALCIUM mg/dL 10 5*         Lab Results   Component Value Date    BLOODCX No Growth at 24 hrs  12/07/2018    BLOODCX No Growth at 24 hrs  12/07/2018    WOUNDCULT Few Colonies of Mixed Skin Debbrah Cart 06/07/2016         Ct Soft Tissue Neck With Contrast    Result Date: 12/7/2018  Narrative: CT NECK WITH CONTRAST INDICATION:   Right submandibular swelling  COMPARISON:  None  TECHNIQUE:  Axial, sagittal, and coronal 2D reformatted images were created from the axial source data and submitted for interpretation  Radiation dose length product (DLP) for this visit:  750 mGy-cm   This examination, like all CT scans performed in the [facility], was performed utilizing techniques to minimize radiation dose exposure, including the use of iterative reconstruction and automated exposure control  IV Contrast:  85 mL of iohexol (OMNIPAQUE) IMAGE QUALITY:  Diagnostic  FINDINGS: VISUALIZED BRAIN PARENCHYMA:  No acute intracranial pathology of the visualized brain parenchyma  VISUALIZED ORBITS AND PARANASAL SINUSES:  Normal  NASAL CAVITY AND NASOPHARYNX:  Normal  SUPRAHYOID NECK:  Normal oral cavity, tongue base, tonsillar fossa and epiglottis  INFRAHYOID NECK:  Aryepiglottic folds and piriform sinuses are normal   Normal glottis and subglottic airway  THYROID GLAND: Hypoplastic left thyroid gland  Incidental discovery of one or more thyroid nodule(s) measuring less than 1 5 cm and without suspicious features is noted in this patient who is above 28years old; according to guidelines published in the February 2015 white paper on incidental thyroid nodules in the Journal of the [employer]), no further evaluation is recommended  PAROTID AND SUBMANDIBULAR GLANDS:  There is a 5 mm obstructing stone in the proximal duct for the right submandibular gland which is enlarged and hyperemic  Some associated surrounding fat stranding/inflammation in the right submandibular region as well  highly suspicious for obstructive sialadenitis  LYMPH NODES:  No pathologic or enlarged adenopathy  VASCULAR STRUCTURES:  Mild atherosclerosis of the proximal right internal carotid artery    No hemodynamically significant stenosis is seen  Otherwise normal enhancement of the cervical vasculature  THORACIC INLET:  Lung apices and upper mediastinum are unremarkable  BONY STRUCTURES: No acute fracture or destructive osseous lesion  Impression: 5 mm obstructing stone in the proximal duct of the right submandibular gland with associated obstructive sialadenitis and surrounding inflammatory changes as described above  Other findings as above  Workstation performed: [id number]       Scheduled Meds:   Current Facility-Administered Medications:  acetaminophen 650 mg Oral Q6H PRN ITALIA Dias-C    ampicillin-sulbactam 3 g Intravenous Q6H ITALIA Dias-C Last Rate: 3 g (12/08/18 0451)   cholecalciferol 1,000 Units Oral Daily ITALIA Dias-C    dexamethasone 10 mg Intravenous Once Jaycee Agarwal DO    [START ON 12/9/2018] dexamethasone 10 mg Intravenous Q12H [address] Jaycee Agarwal DO    fluticasone 1 spray Nasal Daily ITALIA Dias-C    HYDROmorphone 1 mg Intravenous Q3H PRN ITALIA Dias-SANTANA    loratadine 10 mg Oral Daily ITALIA Dias-SANTANA    ondansetron 4 mg Intravenous Q6H PRN ITALIA Dias-C    pantoprazole 20 mg Oral Early Morning ITALIA Dias-C    sodium chloride 125 mL/hr Intravenous Continuous ITALIA Dias-SANTANA Last Rate: 125 mL/hr (12/07/18 2010)     Continuous Infusions:   sodium chloride 125 mL/hr Last Rate: 125 mL/hr (12/07/18 2010)     PRN Meds:   acetaminophen    HYDROmorphone    ondansetron      Physical exam:  Physical Exam   Constitutional: She is oriented to person, place, and time  No distress  HENT:   Head: Normocephalic and atraumatic  Increased rom of her jaw on the right  Edema on the right side of her face  No erythema   Eyes: Pupils are equal, round, and reactive to light  EOM are normal    Neck: Normal range of motion  Neck supple  Cardiovascular: Normal rate, regular rhythm and normal heart sounds  Exam reveals no gallop and no friction rub  No murmur heard  Pulmonary/Chest: Effort normal and breath sounds normal  No respiratory distress  She has no wheezes  She has no rales  Abdominal: Soft  Bowel sounds are normal  She exhibits no distension  There is no tenderness  There is no rebound  Musculoskeletal: Normal range of motion  Neurological: She is alert and oriented to person, place, and time  Skin: Skin is warm and dry  She is not diaphoretic       VTE Pharmacologic Prophylaxis:low risk, early ambulation    Counseling / Coordination of Care  Total floor / unit time spent today 20 minutes      Current Length of Stay: 1 day(s)    Current Patient Status: Inpatient       Code Status: Level 1 - Full Code

## 2018-12-09 VITALS
DIASTOLIC BLOOD PRESSURE: 68 MMHG | WEIGHT: 185 LBS | TEMPERATURE: 98.5 F | OXYGEN SATURATION: 97 % | HEART RATE: 88 BPM | RESPIRATION RATE: 18 BRPM | SYSTOLIC BLOOD PRESSURE: 137 MMHG

## 2018-12-09 PROCEDURE — 99239 HOSP IP/OBS DSCHRG MGMT >30: CPT | Performed by: INTERNAL MEDICINE

## 2018-12-09 RX ORDER — PREDNISONE 20 MG/1
20 TABLET ORAL 2 TIMES DAILY WITH MEALS
Qty: 15 TABLET | Refills: 0 | Status: SHIPPED | OUTPATIENT
Start: 2018-12-09 | End: 2018-12-09

## 2018-12-09 RX ORDER — PREDNISONE 20 MG/1
20 TABLET ORAL 2 TIMES DAILY WITH MEALS
Qty: 15 TABLET | Refills: 0 | Status: SHIPPED | OUTPATIENT
Start: 2018-12-09

## 2018-12-09 RX ORDER — AMOXICILLIN AND CLAVULANATE POTASSIUM 875; 125 MG/1; MG/1
1 TABLET, FILM COATED ORAL EVERY 12 HOURS SCHEDULED
Qty: 16 TABLET | Refills: 0 | Status: SHIPPED | OUTPATIENT
Start: 2018-12-09 | End: 2018-12-17

## 2018-12-09 RX ORDER — HYDROCODONE BITARTRATE AND ACETAMINOPHEN 5; 325 MG/1; MG/1
1 TABLET ORAL EVERY 6 HOURS PRN
Qty: 20 TABLET | Refills: 0 | Status: SHIPPED | OUTPATIENT
Start: 2018-12-09 | End: 2018-12-19

## 2018-12-09 RX ADMIN — SODIUM CHLORIDE 3 G: 9 INJECTION, SOLUTION INTRAVENOUS at 05:45

## 2018-12-09 RX ADMIN — VITAMIN D, TAB 1000IU (100/BT) 1000 UNITS: 25 TAB at 09:51

## 2018-12-09 RX ADMIN — HYDROMORPHONE HYDROCHLORIDE 1 MG: 1 INJECTION, SOLUTION INTRAMUSCULAR; INTRAVENOUS; SUBCUTANEOUS at 05:45

## 2018-12-09 RX ADMIN — FLUTICASONE PROPIONATE 1 SPRAY: 50 SPRAY, METERED NASAL at 09:51

## 2018-12-09 RX ADMIN — DEXAMETHASONE SODIUM PHOSPHATE 10 MG: 4 INJECTION, SOLUTION INTRAMUSCULAR; INTRAVENOUS at 00:14

## 2018-12-09 RX ADMIN — LORATADINE 10 MG: 10 TABLET ORAL at 09:51

## 2018-12-09 RX ADMIN — PANTOPRAZOLE SODIUM 20 MG: 20 TABLET, DELAYED RELEASE ORAL at 05:45

## 2018-12-09 RX ADMIN — ACETAMINOPHEN 650 MG: 325 TABLET ORAL at 10:45

## 2018-12-09 RX ADMIN — DEXAMETHASONE SODIUM PHOSPHATE 10 MG: 4 INJECTION, SOLUTION INTRAMUSCULAR; INTRAVENOUS at 09:51

## 2018-12-09 NOTE — NURSING NOTE
Reviewed discharge instructions with patient including new prescriptions and soft diet at home until follow up  Patient demonstrated understanding  Will continue to monitor patient until discharge

## 2018-12-09 NOTE — DISCHARGE SUMMARY
Discharge Summary - [facility]    Patient Information: Ariella Alegre 57 y o  female MRN: [id number]  Unit/Bed#: E5 -01 Encounter: [id number]    Discharging Physician / Practitioner: Jose Pascal DO  PCP: No primary care provider on file  Admission Date: 12/6/2018  Discharge Date: 12/09/18    Disposition:     Home     Reason for Admission: sialadenitis    Discharge Diagnoses:     Principal Problem:    Sialadenitis  Active Problems:    GERD (gastroesophageal reflux disease)    Seasonal allergies  Resolved Problems:    * No resolved hospital problems  *      Consultations During Hospital Stay:  · ENT    Procedures Performed:     · none    Significant Findings / Test Results:   Ct Soft Tissue Neck With Contrast  Result Date: 12/7/2018  Impression: 5 mm obstructing stone in the proximal duct of the right submandibular gland with associated obstructive sialadenitis and surrounding inflammatory changes as described above  Incidental Findings:   · none    Test Results Pending at Discharge (will require follow up):   · none     Outpatient Tests Requested:  None    Hospital Course:     Ariella Alegre is a 57 y o  female patient who originally presented to the hospital on 12/6/2018 due to Right submandibular sialoadenitis with secondary cellulitis  She was given decadron and started on unasyn  ENT evaluated her and the stone was 5mm  A procedure could not be preformed as she had decreased rom of her jaw  Fortunately she passed the stone on her own  She did have increased swelling after this in which steroids were re dosed and continued  She was transitioned to po prednisone of 60mg daily in divided doses in which she will start a taper in 3 days  She was also transitioned to augmentin to finish the course  She will follow up with ent early this week       She was discharged to home       Discharge Day Visit / Exam:     * Please refer to separate progress note for these details *    Discharge instructions/Information to patient and family:   See after visit summary for information provided to patient and family  Provisions for Follow-Up Care:  See after visit summary for information related to follow-up care and any pertinent home health orders  Discharge Statement:  I spent 36 minutes discharging the patient  This time was spent on the day of discharge  I had direct contact with the patient on the day of discharge  Greater than 50% of the total time was spent examining patient, answering all patient questions, arranging and discussing plan of care with patient as well as directly providing post-discharge instructions  Additional time then spent on discharge activities  Discharge Medications:  See after visit summary for reconciled discharge medications provided to patient and family

## 2018-12-09 NOTE — PROGRESS NOTES
[facility] Progress Note  Patient: Angel Leon 57 y o  female   MRN: [id number]  PCP: No primary care provider on file  Unit/Bed#: E5 -01 Encounter: [id number]  Date Of Visit: 12/09/18      Assessment/plan  1  Right submandibular sialoadenitis with secondary cellulitis- appreciate ent recommendations  Pt passed the stone  Increased rom and decreased edema from yesterday  Still with pain at times  Will give one more dose of decadron IV  Will change to prednisone 30mg bid  Will taper this every few days  Pt has been treated with unasyn  Day 3/10  Will change to augmentin to finish course  She will follow up with ent early this week       2  gerd- continue ppi    dispo- d/c to home with follow up with ent    Subjective:   Pt seen and examined  Pt states she feels better after steroids  She thinks the swelling is decreased  She also states she is able to have increased range of motion  No f/c no cp no sob no n/v/d no abd pain    Objective:     Vitals: Blood pressure 148/87, pulse 70, temperature 97 8 °F (36 6 °C), temperature source Temporal, resp  rate 18, weight 83 9 kg (185 lb), SpO2 97 %  ,There is no height or weight on file to calculate BMI  Lab, Imaging and other studies:    Results from last 7 days  Lab Units 12/07/18  0517   WBC Thousand/uL 10 37*   HEMOGLOBIN g/dL 14 1   HEMATOCRIT % 42 8   PLATELETS Thousands/uL 224       Results from last 7 days  Lab Units 12/07/18  0517   POTASSIUM mmol/L 4 2   CHLORIDE mmol/L 105   CO2 mmol/L 25   BUN mg/dL 10   CREATININE mg/dL 0 74   CALCIUM mg/dL 10 5*         Lab Results   Component Value Date    BLOODCX No Growth at 24 hrs  12/07/2018    BLOODCX No Growth at 24 hrs  12/07/2018    WOUNDCULT Few Colonies of Mixed Skin [place] 06/07/2016         Ct Soft Tissue Neck With Contrast    Result Date: 12/7/2018  Narrative: CT NECK WITH CONTRAST INDICATION:   Right submandibular swelling  COMPARISON:  None   TECHNIQUE:  Axial, sagittal, and coronal 2D reformatted images were created from the axial source data and submitted for interpretation  Radiation dose length product (DLP) for this visit:  750 mGy-cm   This examination, like all CT scans performed in the [facility], was performed utilizing techniques to minimize radiation dose exposure, including the use of iterative reconstruction and automated exposure control  IV Contrast:  85 mL of iohexol (OMNIPAQUE) IMAGE QUALITY:  Diagnostic  FINDINGS: VISUALIZED BRAIN PARENCHYMA:  No acute intracranial pathology of the visualized brain parenchyma  VISUALIZED ORBITS AND PARANASAL SINUSES:  Normal  NASAL CAVITY AND NASOPHARYNX:  Normal  SUPRAHYOID NECK:  Normal oral cavity, tongue base, tonsillar fossa and epiglottis  INFRAHYOID NECK:  Aryepiglottic folds and piriform sinuses are normal   Normal glottis and subglottic airway  THYROID GLAND: Hypoplastic left thyroid gland  Incidental discovery of one or more thyroid nodule(s) measuring less than 1 5 cm and without suspicious features is noted in this patient who is above 28years old; according to guidelines published in the February 2015 white paper on incidental thyroid nodules in the Journal of the [employer]), no further evaluation is recommended  PAROTID AND SUBMANDIBULAR GLANDS:  There is a 5 mm obstructing stone in the proximal duct for the right submandibular gland which is enlarged and hyperemic  Some associated surrounding fat stranding/inflammation in the right submandibular region as well  highly suspicious for obstructive sialadenitis  LYMPH NODES:  No pathologic or enlarged adenopathy  VASCULAR STRUCTURES:  Mild atherosclerosis of the proximal right internal carotid artery  No hemodynamically significant stenosis is seen  Otherwise normal enhancement of the cervical vasculature  THORACIC INLET:  Lung apices and upper mediastinum are unremarkable   BONY STRUCTURES: No acute fracture or destructive osseous lesion  Impression: 5 mm obstructing stone in the proximal duct of the right submandibular gland with associated obstructive sialadenitis and surrounding inflammatory changes as described above  Other findings as above  Workstation performed: [id number]       Scheduled Meds:   Current Facility-Administered Medications:  acetaminophen 650 mg Oral Q6H PRN Chrisandra Goodnight, PA-C    ampicillin-sulbactam 3 g Intravenous Q6H Chrisandra Goodnight, PA-C Last Rate: 3 g (12/09/18 0545)   cholecalciferol 1,000 Units Oral Daily Chrisandra Goodnight, PA-C    dexamethasone 10 mg Intravenous Q12H [facility] Jaycee Agarwal DO    fluticasone 1 spray Nasal Daily Chrisandra Goodnight, PA-C    HYDROmorphone 1 mg Intravenous Q3H PRN Chrisandra Goodnight, PA-C    loratadine 10 mg Oral Daily Chrisandra Goodnight, PA-C    ondansetron 4 mg Intravenous Q6H PRN Chrisandra Goodnight, PA-C    pantoprazole 20 mg Oral Early Morning Chrisandra Goodnight, PA-C    sodium chloride 125 mL/hr Intravenous Continuous Chrisandra Goodnight, PA-C Last Rate: 125 mL/hr (12/08/18 1449)     Continuous Infusions:   sodium chloride 125 mL/hr Last Rate: 125 mL/hr (12/08/18 1449)     PRN Meds:   acetaminophen    HYDROmorphone    ondansetron      Physical exam:  Physical Exam   Constitutional: She is oriented to person, place, and time  No distress  HENT:   Head: Normocephalic and atraumatic  Decrease edema on the right side of her face/jaw  Increase rom of jaw no erythema   Eyes: Pupils are equal, round, and reactive to light  EOM are normal    Neck: Normal range of motion  Neck supple  Cardiovascular: Normal rate, regular rhythm and normal heart sounds  Exam reveals no gallop and no friction rub  No murmur heard  Pulmonary/Chest: Effort normal and breath sounds normal  No respiratory distress  She has no wheezes  She has no rales  Abdominal: Soft  Bowel sounds are normal  She exhibits no distension  There is no tenderness  There is no rebound     Neurological: She is alert and oriented to person, place, and time  Skin: Skin is warm and dry  She is not diaphoretic  No erythema

## 2018-12-12 LAB
BACTERIA BLD CULT: NORMAL
BACTERIA BLD CULT: NORMAL

## 2019-12-10 ENCOUNTER — TELEPHONE (OUTPATIENT)
Dept: HEMATOLOGY ONCOLOGY | Facility: CLINIC | Age: 49
End: 2019-12-10

## 2019-12-10 NOTE — TELEPHONE ENCOUNTER
Patient returned Mirna Troncoso RN call   She asked to have the nurse calling her back at [phone]